# Patient Record
Sex: FEMALE | Race: BLACK OR AFRICAN AMERICAN
[De-identification: names, ages, dates, MRNs, and addresses within clinical notes are randomized per-mention and may not be internally consistent; named-entity substitution may affect disease eponyms.]

---

## 2018-09-14 ENCOUNTER — HOSPITAL ENCOUNTER (OUTPATIENT)
Dept: HOSPITAL 92 - ERS | Age: 58
Setting detail: OBSERVATION
LOS: 1 days | Discharge: HOME | End: 2018-09-15
Attending: FAMILY MEDICINE | Admitting: FAMILY MEDICINE
Payer: SELF-PAY

## 2018-09-14 DIAGNOSIS — Z79.899: ICD-10-CM

## 2018-09-14 DIAGNOSIS — R07.89: Primary | ICD-10-CM

## 2018-09-14 DIAGNOSIS — Z79.82: ICD-10-CM

## 2018-09-14 DIAGNOSIS — Z86.711: ICD-10-CM

## 2018-09-14 DIAGNOSIS — E10.9: ICD-10-CM

## 2018-09-14 DIAGNOSIS — Z88.8: ICD-10-CM

## 2018-09-14 DIAGNOSIS — F32.9: ICD-10-CM

## 2018-09-14 DIAGNOSIS — E89.0: ICD-10-CM

## 2018-09-14 DIAGNOSIS — Z79.4: ICD-10-CM

## 2018-09-14 DIAGNOSIS — Z88.0: ICD-10-CM

## 2018-09-14 DIAGNOSIS — Z98.890: ICD-10-CM

## 2018-09-14 LAB
ALBUMIN SERPL BCG-MCNC: 4.1 G/DL (ref 3.5–5)
ALP SERPL-CCNC: 106 U/L (ref 40–150)
ALT SERPL W P-5'-P-CCNC: 21 U/L (ref 8–55)
ANION GAP SERPL CALC-SCNC: 10 MMOL/L (ref 10–20)
ANION GAP SERPL CALC-SCNC: 14 MMOL/L (ref 10–20)
AST SERPL-CCNC: 21 U/L (ref 5–34)
BASOPHILS # BLD AUTO: 0 THOU/UL (ref 0–0.2)
BASOPHILS NFR BLD AUTO: 0.4 % (ref 0–1)
BILIRUB SERPL-MCNC: 0.4 MG/DL (ref 0.2–1.2)
BUN SERPL-MCNC: 11 MG/DL (ref 9.8–20.1)
BUN SERPL-MCNC: 12 MG/DL (ref 9.8–20.1)
CALCIUM SERPL-MCNC: 8.9 MG/DL (ref 7.8–10.44)
CALCIUM SERPL-MCNC: 9.4 MG/DL (ref 7.8–10.44)
CHD RISK SERPL-RTO: 2.7 (ref ?–4.5)
CHLORIDE SERPL-SCNC: 103 MMOL/L (ref 98–107)
CHLORIDE SERPL-SCNC: 106 MMOL/L (ref 98–107)
CHOLEST SERPL-MCNC: 206 MG/DL
CK MB SERPL-MCNC: 1.1 NG/ML (ref 0–6.6)
CK SERPL-CCNC: 95 U/L (ref 29–168)
CO2 SERPL-SCNC: 22 MMOL/L (ref 22–29)
CO2 SERPL-SCNC: 25 MMOL/L (ref 22–29)
CREAT CL PREDICTED SERPL C-G-VRATE: 0 ML/MIN (ref 70–130)
CREAT CL PREDICTED SERPL C-G-VRATE: 0 ML/MIN (ref 70–130)
EOSINOPHIL # BLD AUTO: 0.2 THOU/UL (ref 0–0.7)
EOSINOPHIL NFR BLD AUTO: 3.4 % (ref 0–10)
GLOBULIN SER CALC-MCNC: 3.4 G/DL (ref 2.4–3.5)
GLUCOSE SERPL-MCNC: 224 MG/DL (ref 70–105)
GLUCOSE SERPL-MCNC: 314 MG/DL (ref 70–105)
HDLC SERPL-MCNC: 77 MG/DL
HGB BLD-MCNC: 12.9 G/DL (ref 12–16)
LDLC SERPL CALC-MCNC: 118 MG/DL
LIPASE SERPL-CCNC: 14 U/L (ref 8–78)
LYMPHOCYTES # BLD: 2.3 THOU/UL (ref 1.2–3.4)
LYMPHOCYTES NFR BLD AUTO: 36.4 % (ref 21–51)
MAGNESIUM SERPL-MCNC: 1.8 MG/DL (ref 1.6–2.6)
MCH RBC QN AUTO: 25.7 PG (ref 27–31)
MCV RBC AUTO: 77.8 FL (ref 78–98)
MONOCYTES # BLD AUTO: 0.2 THOU/UL (ref 0.11–0.59)
MONOCYTES NFR BLD AUTO: 3.2 % (ref 0–10)
NEUTROPHILS # BLD AUTO: 3.5 THOU/UL (ref 1.4–6.5)
NEUTROPHILS NFR BLD AUTO: 56.6 % (ref 42–75)
PLATELET # BLD AUTO: 268 THOU/UL (ref 130–400)
POTASSIUM SERPL-SCNC: 3.8 MMOL/L (ref 3.5–5.1)
POTASSIUM SERPL-SCNC: 4.1 MMOL/L (ref 3.5–5.1)
RBC # BLD AUTO: 5.01 MILL/UL (ref 4.2–5.4)
SODIUM SERPL-SCNC: 135 MMOL/L (ref 136–145)
SODIUM SERPL-SCNC: 137 MMOL/L (ref 136–145)
TRIGL SERPL-MCNC: 55 MG/DL (ref ?–150)
TROPONIN I SERPL DL<=0.01 NG/ML-MCNC: (no result) NG/ML (ref ?–0.03)
WBC # BLD AUTO: 6.2 THOU/UL (ref 4.8–10.8)

## 2018-09-14 PROCEDURE — 80061 LIPID PANEL: CPT

## 2018-09-14 PROCEDURE — 83735 ASSAY OF MAGNESIUM: CPT

## 2018-09-14 PROCEDURE — 78452 HT MUSCLE IMAGE SPECT MULT: CPT

## 2018-09-14 PROCEDURE — 84443 ASSAY THYROID STIM HORMONE: CPT

## 2018-09-14 PROCEDURE — 96361 HYDRATE IV INFUSION ADD-ON: CPT

## 2018-09-14 PROCEDURE — 96374 THER/PROPH/DIAG INJ IV PUSH: CPT

## 2018-09-14 PROCEDURE — A9500 TC99M SESTAMIBI: HCPCS

## 2018-09-14 PROCEDURE — G0378 HOSPITAL OBSERVATION PER HR: HCPCS

## 2018-09-14 PROCEDURE — 84484 ASSAY OF TROPONIN QUANT: CPT

## 2018-09-14 PROCEDURE — 82553 CREATINE MB FRACTION: CPT

## 2018-09-14 PROCEDURE — 80053 COMPREHEN METABOLIC PANEL: CPT

## 2018-09-14 PROCEDURE — 96372 THER/PROPH/DIAG INJ SC/IM: CPT

## 2018-09-14 PROCEDURE — 36415 COLL VENOUS BLD VENIPUNCTURE: CPT

## 2018-09-14 PROCEDURE — 93017 CV STRESS TEST TRACING ONLY: CPT

## 2018-09-14 PROCEDURE — 84100 ASSAY OF PHOSPHORUS: CPT

## 2018-09-14 PROCEDURE — 85025 COMPLETE CBC W/AUTO DIFF WBC: CPT

## 2018-09-14 PROCEDURE — 83690 ASSAY OF LIPASE: CPT

## 2018-09-14 PROCEDURE — 71045 X-RAY EXAM CHEST 1 VIEW: CPT

## 2018-09-14 PROCEDURE — 93005 ELECTROCARDIOGRAM TRACING: CPT

## 2018-09-14 PROCEDURE — 83880 ASSAY OF NATRIURETIC PEPTIDE: CPT

## 2018-09-14 NOTE — RAD
PORTABLE CHEST 

9/14/18

 

PROVIDED CLINICAL HISTORY:  

Chest pain.

 

FINDINGS:  

Comparison 9/18/10.

 

Cardiac and mediastinal silhouette is unchanged in appearance. Elevation of the right hemidiaphragm i
s redemonstrated, stable as compared to prior. No focal consolidation, pleural fluid or pneumothorax 
apparent. 

 

IMPRESSION:  

Stable radiographic appearance of the chest. 

 

POS: OFF

## 2018-09-14 NOTE — PDOC.FPRHP
- History of Present Illness


Chief Complaint: CP


History of Present Illness: 


Pt is a 57yo F with PMH of DM type 1, HTN, and hypothyroidism presenting with 

CP. Onset was this AM while laying in bed. Pt was wakened by Pain in L arm 

which then progressed to R chest --> L chest and L shoulder. Reports related 

symptoms of sweating and light headedness, denies syncope. Reports she became 

SOB when EMS was called but that it could be due to her anxiety. Pain was 

relieved by nitro per pt and further relieved by fentanyl administered in ED. 

Pt has hx of cardiac workup with negative Catheterizations but has strong 

family hx of CAD.





ED Course: 


trops: negative x2, EKG- RBBB, no ST elevations, CXR- unremarkable, BNP- 29








- Allergies/Adverse Reactions


 Allergies











Allergy/AdvReac Type Severity Reaction Status Date / Time


 


Penicillins Allergy   Verified 09/14/18 20:23


 


acetaminophen AdvReac   Verified 09/14/18 21:45














- Home Medications


 











 Medication  Instructions  Recorded  Confirmed  Type


 


Aspirin [Aspir-Low] 81 mg PO DAILY 09/14/18 09/14/18 History


 


Biotin 300 mcg PO DAILY 09/14/18 09/14/18 History


 


Bromelains 500 mg PO PC PRN 09/14/18 09/14/18 History


 


HumaLOG [HumaLOG Vial] 0 units SQ PRN PRN 09/14/18 09/14/18 History


 


Levothyroxine Sodium [Tirosint] 88 mcg PO DAILY 09/14/18 09/14/18 History


 


Losartan Potassium 50 mg PO DAILY 09/14/18 09/14/18 History


 


Pravastatin Sodium [Pravachol] 20 mg PO HS 09/14/18 09/14/18 History


 


Sertraline HCl [Zoloft] 50 mg PO DAILY 09/14/18 09/14/18 History


 


Ubidecarenone [CoQ-10] 100 mg PO DAILY 09/14/18 09/14/18 History














- History


PMHx: HTN, DM 1, Hypothyroid, PE


 


PSHx: fibroid surgery, thyroidectomy, tonsilectomy, unspecified lung surgery 

for PE





FHx: CAD


 


Social: EtOH- socially, denies tobacco/drugs


 








- Review of Systems


General: denies: fever/chills, fatigue


Eyes: denies: vision changes


ENT: denies: nasal congestion


Respiratory: reports: shortness of breath.  denies: cough, congestion


Cardiovascular: reports: chest pain.  denies: edema


Gastrointestinal: denies: nausea, vomiting


Genitourinary: denies: dysuria


Skin: denies: rashes, lesions


Psychological: reports: anxiety





- Vital signs


BP: [150/72]  HR: [72] RR: [18] Tmax: [98.1] Pox: [97]% on [RA]  Wt: [63]   








- Physical Exam


Constitutional: NAD, awake, alert and oriented


HEENT: normocephalic and atraumatic, EOMI, conjunctiva clear, normal nasal 

mucosa, MMM


Neck: trachea midline


Heart: RRR, normal S1/S2, no murmurs/rubs/gallops


Lungs: CTAB, no respiratory distress, good air movement


Abdomen: soft, non-tender


Musculoskeletal: normal structure, normal tone


Neurological: normal sensation


Skin: no rash/lesions, good turgor


Heme/Lymphatic: no purpura, no petechia


Psychiatric: normal mood and affect, good judgment and insight





FMR H&P: Results





- Labs


Result Diagrams: 


 09/14/18 16:10





 09/14/18 22:08


Lab results: 


 











WBC  6.2 thou/uL (4.8-10.8)   09/14/18  16:10    


 


Hgb  12.9 g/dL (12.0-16.0)   09/14/18  16:10    


 


Hct  39.0 % (36.0-47.0)   09/14/18  16:10    


 


MCV  77.8 fL (78.0-98.0)  L  09/14/18  16:10    


 


Plt Count  268 thou/uL (130-400)   09/14/18  16:10    


 


Neutrophils %  56.6 % (42.0-75.0)   09/14/18  16:10    


 


Sodium  135 mmol/L (136-145)  L  09/14/18  16:10    


 


Potassium  4.1 mmol/L (3.5-5.1)   09/14/18  16:10    


 


Chloride  103 mmol/L ()   09/14/18  16:10    


 


Carbon Dioxide  22 mmol/L (22-29)   09/14/18  16:10    


 


BUN  12 mg/dL (9.8-20.1)   09/14/18  16:10    


 


Creatinine  0.92 mg/dL (0.6-1.1)   09/14/18  16:10    


 


Glucose  224 mg/dL ()  H  09/14/18  16:10    


 


Calcium  9.4 mg/dL (7.8-10.44)   09/14/18  16:10    


 


Total Bilirubin  0.4 mg/dL (0.2-1.2)   09/14/18  16:10    


 


AST  21 U/L (5-34)   09/14/18  16:10    


 


ALT  21 U/L (8-55)   09/14/18  16:10    


 


Alkaline Phosphatase  106 U/L ()   09/14/18  16:10    


 


Creatine Kinase  95 U/L ()   09/14/18  16:10    


 


CK-MB (CK-2)  1.1 ng/mL (0-6.6)   09/14/18  16:10    


 


B-Natriuretic Peptide  29.4 pg/mL (0-100)   09/14/18  16:10    


 


Serum Total Protein  7.5 g/dL (6.0-8.3)   09/14/18  16:10    


 


Albumin  4.1 g/dL (3.5-5.0)   09/14/18  16:10    


 


Lipase  14 U/L (8-78)   09/14/18  16:10    














FMR H&P: A/P





- Problem List


(1) Chest pain at rest


Current Visit: No   Status: Acute   Code(s): R07.9 - CHEST PAIN, UNSPECIFIED   





(2) Type 1 diabetes mellitus


Current Visit: No   Status: Acute   





(3) HTN (hypertension)


Current Visit: No   Status: Acute   Code(s): I10 - ESSENTIAL (PRIMARY) 

HYPERTENSION   





(4) Hypothyroid


Current Visit: No   Status: Acute   Code(s): E03.9 - HYPOTHYROIDISM, 

UNSPECIFIED   





- Plan


Mixed typical/atypical chest pain likely 2/2 to anxiety vs. musculoskeletal, r/

o ACS


A- Pt has mixed components of CP, HEART score of 4 with strong family hx. Will 

need to r/o ACS despite normal trops. etc.


P- trend trops


-Stress test in AM


-Mg, Phos, TSH, FLP


-NPO after midnight





Diabetes, type 1


A- Pt manages blood sugars with pump, pt npo after midnight. 


P- Continue pump management


-will give juice and D50 for hypoglycemia while NPO


-ACHS checks





HTN


-home meds





Hypothyroid


-home meds





Hx of PE


-SCDs and ppx lovenox








FMR H&P: Upper Level





- Pertinent history


Ale Coley is a 58 year old female who was sent to the ED from clinic due to 

complaints of chest pain. Pt reported left-sided chest pain that began after 

she woke up this morning. The pain was associated with diaphoresis and 

shortness of breath. She presented to the clinic where an EKG was performed and 

was unremarkable. She was given aspirin and nitroglycerin which helped relieve 

her pain. She has a significant family history of premature CHD in her mother. 

Patient has had 3 cardiac catheterizaitons, the most recent of which was in 

2016. She has been evaluated in the clinic for chest pain, which she has 

previously described as being provoked by anxiety.





- Pertinent findings





Vitals: BP: 150/72   P: 72   RR: 18   T: 98.1   O2: 97% on RA





Physical Exam:


General: alert and oriented in no apparent distress; normal affect and 

interaction.


Heart: regular rate and rhythm, no murmurs, rubs, or gallops. No reproducible 

chest wall tenderness


Lungs: clear to auscultation bilaterally.








Trop: negative





EKG: RBBB stable compared to prior 











- Plan


Date/Time: 09/14/18 1911








I, Sintia Amato, have evaluated this patient and agree with findings/plan as 

outlined by intern resident. Pertinent changes/additions are listed here.








Atypical chest pain, rule out ACS.


- Given heart score of 4 will risk stratify with stress testing.


- we will admit patient to telemetry for observation overnight. 


- NPO after midnight. hypoglycemia protocol in case





Diabetes Mellitus Type, 1


- pt has insulin pump in place;


- AC/HS accuchecks


- consistent carb diet


- hypoglycemia protocol





Hypothyroidism


- home meds added.





Depression


- continue Zoloft.





Hypertension


 - Resume home meds.





DVT proph: Lovenox.





Attending Addendum





- Attending Addendum


Date/Time: 09/15/18 0708





I personally evaluated the patient and discussed the management with Dr. Sow

/Lima.


I agree with the History, Examination, Assessment and Plan documented above 

with any addition or exceptions noted below.





Please see event note from 9/14/18 for further details.

## 2018-09-14 NOTE — PDOC.EVN
Attending Addendum





- Attending Addendum


Date/Time: 09/14/18 7449





I personally evaluated the patient and discussed the management with Dr. Sow

/Lima.


I agree with the History, Examination, Assessment and Plan documented in the H&

P with any addition or exceptions noted below.





Patient is 59 yo female with PMHx of DM1, Hypothyroidism, HLD here with chest 

pain that began this morning. She was sent to the ER from our clinic due to 

concern for ACS. She reports that upon awakening this morning, she had onset of 

L arm pain that radiated to her L chest and then R chest. Reports some on and 

off lightheadedness, sweats, palpitations, but denies syncope, nausea. Reports 

no history of similar pain, though she has had chest pain in the past with per 

her history 3 negative cath's in the past. She has extensive family history of 

CAD with MI in her mother and grandmother. EKG at clinic was normal but she was 

sent over due to concern for ACS. She has a benign exam. Initial troponins 

negative, and EKG without ST segment changes. Patient will be admitted for ACS 

rule out due to comorbid conditions and extensive family history. Trend trops, 

EKG, and obtain stress testing in AM. Keeping her NPO may be a challenge due to 

her history of T1DM with current insulin pump use. If hypoglycemia occurs, will 

need to use either simple juice or D50 and avoid caffeine to prevent issues 

with the stress testing. Further mgmt per result of testing or other lab 

abnormalities noted. Risk stratify as needed.

## 2018-09-15 VITALS — DIASTOLIC BLOOD PRESSURE: 60 MMHG | SYSTOLIC BLOOD PRESSURE: 136 MMHG | TEMPERATURE: 98.2 F

## 2018-09-15 NOTE — NM
NUCLEAR MEDICINE CARDIAC STRESS TEST WITH EJECTION FRACTION

9/15/18

 

HISTORY: 

Chest pain.

 

COMPARISON:  

None. 

 

FINDINGS:  

Stress and rest performed after the intravenous administration of 30 and 9 millicuries technetium 99m
 Sestamibi, respectively.

 

There is adequate left ventricular uptake of radiotracer. Normal wall motion. No scar or ischemia. Ca
lculated ejection fraction 69%. 

 

IMPRESSION:  

Normal nuclear medicine cardiac stress test with ejection fraction. 

 

 

POS: TOD

## 2018-09-15 NOTE — PDOC.FM
- Subjective


Subjective: 


Pt is a 59yo female with pmh of DM1, HTN, and hypothyroidism presenting with 

atypical CP.








- Objective


Vital Signs & Weight: 


 Vital Signs (12 hours)











  Temp Pulse Resp BP Pulse Ox


 


 09/15/18 03:09  98.5 F  63  16  115/57 L  95


 


 09/14/18 23:15   68  20  137/62  96


 


 09/14/18 20:01  98.4 F  64  20  155/67 H  97











I&O: 


 











 09/13/18 09/14/18 09/15/18





 06:59 06:59 06:59


 


Intake Total   360


 


Output Total   350


 


Balance   10











Result Diagrams: 


 09/14/18 16:10





 09/14/18 22:08





<Roberta Marks - Last Filed: 09/15/18 10:45>





- Objective


Vital Signs & Weight: 


 Vital Signs (12 hours)











  Temp Pulse Resp BP BP Pulse Ox


 


 09/15/18 07:16  98.2 F  66  16   136/60  98


 


 09/15/18 03:09  98.5 F  63  16  115/57 L   95


 


 09/14/18 23:15   68  20  137/62   96








 Weight











Admit Weight                   77.292 kg


 


Weight                         77.292 kg














I&O: 


 











 09/14/18 09/15/18 09/16/18





 06:59 06:59 06:59


 


Intake Total  360 


 


Output Total  350 


 


Balance  10 











Result Diagrams: 


 09/14/18 16:10





 09/14/18 22:08





<Prabhjot May - Last Filed: 09/15/18 10:51>





Dx/Plan


(1) Chest pain at rest


Code(s): R07.9 - CHEST PAIN, UNSPECIFIED   Status: Acute   





(2) Diabetes type 1, controlled


Code(s): E10.9 - TYPE 1 DIABETES MELLITUS WITHOUT COMPLICATIONS   Status: Acute

   





(3) HTN (hypertension)


Code(s): I10 - ESSENTIAL (PRIMARY) HYPERTENSION   Status: Acute   





(4) Hypothyroid


Code(s): E03.9 - HYPOTHYROIDISM, UNSPECIFIED   Status: Acute   





(5) Type 1 diabetes mellitus


Status: Acute   





- Plan


Plan: 


Pt is a 59yo female with pmh of DM1, HTN, and hypothyroidism presenting with 

atypical CP.





Atypical chest pain, rule out ACS


- Recent hx of neg cardiac workup including cardiac caths but strong FH and 

multiple risk factors


- Heart score of 4 


- Trops neg x3


- EKG with no ST changes


- NPO for Stress this AM


- Mg, Ph wnl


- TSH 0.2398


- FLP with elevated cholesterol


- Pt does not appear to be on a home statin, started on Pravastatin 20mg





Diabetes Mellitus Type 1


- Pt has insulin pump


- ACHS accuchecks


- Consistent carb diet





Hypothyroidism


- Continue Levothyroxine


- TSH low at 0.2398


- Decreased levothyroxine to 50mcg, will need to f/u outpatient





Depression


- Continue home Zoloft





Hypertension


 - Continue home losartan





DVT ppx: Lovenox











<Roberta Marks - Last Filed: 09/15/18 10:45>


(1) Chest pain at rest


Code(s): R07.9 - CHEST PAIN, UNSPECIFIED   Status: Acute   





(2) Type 1 diabetes mellitus


Status: Acute   





(3) HTN (hypertension)


Code(s): I10 - ESSENTIAL (PRIMARY) HYPERTENSION   Status: Acute   





(4) Hypothyroid


Code(s): E03.9 - HYPOTHYROIDISM, UNSPECIFIED   Status: Acute   





<Prabhjot May - Last Filed: 09/15/18 10:51>





Attending Addendum





- Attending Addendum


Date/Time: 09/15/18 1049





I personally evaluated the patient and discussed the management with Dr. Marks.


I agree with the History, Examination, Assessment and Plan documented above 

with any addition or exceptions noted below.





Patient here for chest pain and has been ruled out for ACS. She had negative 

cath 2 years ago. Stress today due to extensive family history and elevated 

HEART score. Await stress testing and further mgmt pending those results. 








<Prabhjot May - Last Filed: 09/15/18 10:51>

## 2018-09-17 NOTE — DIS-2
DATE OF ADMISSION:  09/14/2018

 

DATE OF DISCHARGE:  09/15/2018

 

RESIDENT:  Roberta Marks, PGY-1.

 

ADMITTING ATTENDING:  Prabhjot May M.D.

 

DISCHARGE ATTENDING:  Prabhjot May M.D.

 

CONSULTATIONS:  None.

 

PROCEDURES:  

1. Normal nuclear medicine cardiac stress test, ejection fraction 69%.

 

PRIMARY DIAGNOSIS:  

1. Atypical chest pain.

 

SECONDARY DIAGNOSES:

1.  Type 1 diabetes, controlled.

2.  Hypertension.

3.  Hypothyroidism.

4.  Depression.

 

DISCONTINUED MEDICATIONS:  None.

 

HISTORY OF PRESENT ILLNESS AND HOSPITAL COURSE:  Ms. Coley is a 58-year-old 
female presenting with atypical chest pain that started the morning of 
admission associated with shortness of breath.  The pain was relieved with 
nitro and fentanyl.  She recently had a cardiac catheterization 2 years prior 
that was negative, but has a strong family history of CAD, so stress test was 
performed which was ultimately negative.  She was discharged in stable condition
, chest pain free.

 

DISPOSITION:  Stable.

 

DISCHARGE INSTRUCTIONS:

1.  Location:  Home.

2.  Diet:  Low sodium.

3.  Activity:  No restrictions.

4.  Follow up with PCP, Dr. Amato within 3 days.

 

RADHA

## 2018-09-21 NOTE — STRESS
Acquisition Time: 2018-09-15  13:22:06

Total Exercise Time: 00:06:30

Test Indications: CHEST PAIN

Medications: 

 

 

 

 

 

 

 

 

 

Protocol:     KRAIG

Max HR: 142 BPM  87% of  Pred: 162 BPM

Max BP: 180/078 mmHG

Max Work Load: 8.5 METS

 

THE PATIENT EXERCISED FOR 6:30 ON A KRAIG PROTOCOL. PEAK HEART RATE = 142 BPM

 AND TARGET HEART RATE = 138 BPM. SHE DID NOT DEVELOP CHEST PAIN. THERE WAS NO

 SIGNIFICANT ST DEPRESSION WITH EXERCISE. NORMAL EXERCISE TREADMILL TEST. AWAIT

 NUCLEAR IMGAES FOR DEFINITIVE DIAGNOSIS.

 

Confirmed by DAVID FLEMING ([id number]),  ADELIA BONE ([id number]) on 9/21/2018 9:28:05 AM

 

Referred By:             Confirmed By:DAVID FLEMING

## 2018-09-22 NOTE — EKG
Test Reason : CP

Blood Pressure : ***/*** mmHG

Vent. Rate : 064 BPM     Atrial Rate : 064 BPM

   P-R Int : 126 ms          QRS Dur : 118 ms

    QT Int : 444 ms       P-R-T Axes : 074 055 043 degrees

   QTc Int : 458 ms

 

Normal sinus rhythm

Right bundle branch block

Abnormal ECG

 

Confirmed by FRANCISCA CARBAJAL, KADEN ([id number]),  JOSEE BUENO ([id number]) on 9/22/2018 11:30:56 AM

 

Referred By:             Confirmed By:KADEN ESPINOSA MD